# Patient Record
Sex: FEMALE | Race: WHITE | NOT HISPANIC OR LATINO
[De-identification: names, ages, dates, MRNs, and addresses within clinical notes are randomized per-mention and may not be internally consistent; named-entity substitution may affect disease eponyms.]

---

## 2017-04-26 ENCOUNTER — APPOINTMENT (OUTPATIENT)
Dept: OTOLARYNGOLOGY | Facility: CLINIC | Age: 67
End: 2017-04-26

## 2017-05-10 ENCOUNTER — APPOINTMENT (OUTPATIENT)
Dept: OTOLARYNGOLOGY | Facility: CLINIC | Age: 67
End: 2017-05-10

## 2017-05-10 VITALS
SYSTOLIC BLOOD PRESSURE: 132 MMHG | BODY MASS INDEX: 21.34 KG/M2 | HEART RATE: 78 BPM | WEIGHT: 125 LBS | HEIGHT: 64 IN | DIASTOLIC BLOOD PRESSURE: 84 MMHG

## 2018-05-21 ENCOUNTER — APPOINTMENT (OUTPATIENT)
Dept: OTOLARYNGOLOGY | Facility: CLINIC | Age: 68
End: 2018-05-21

## 2018-05-30 ENCOUNTER — APPOINTMENT (OUTPATIENT)
Dept: OTOLARYNGOLOGY | Facility: CLINIC | Age: 68
End: 2018-05-30
Payer: COMMERCIAL

## 2018-05-30 VITALS
SYSTOLIC BLOOD PRESSURE: 135 MMHG | HEIGHT: 64 IN | WEIGHT: 125 LBS | TEMPERATURE: 98.9 F | HEART RATE: 73 BPM | BODY MASS INDEX: 21.34 KG/M2 | DIASTOLIC BLOOD PRESSURE: 86 MMHG | OXYGEN SATURATION: 100 %

## 2018-05-30 DIAGNOSIS — Z82.49 FAMILY HISTORY OF ISCHEMIC HEART DISEASE AND OTHER DISEASES OF THE CIRCULATORY SYSTEM: ICD-10-CM

## 2018-05-30 DIAGNOSIS — Z83.3 FAMILY HISTORY OF DIABETES MELLITUS: ICD-10-CM

## 2018-05-30 PROCEDURE — 99213 OFFICE O/P EST LOW 20 MIN: CPT

## 2019-07-10 ENCOUNTER — APPOINTMENT (OUTPATIENT)
Dept: OTOLARYNGOLOGY | Facility: CLINIC | Age: 69
End: 2019-07-10

## 2019-07-24 ENCOUNTER — APPOINTMENT (OUTPATIENT)
Dept: OTOLARYNGOLOGY | Facility: CLINIC | Age: 69
End: 2019-07-24
Payer: COMMERCIAL

## 2019-07-24 VITALS
SYSTOLIC BLOOD PRESSURE: 144 MMHG | BODY MASS INDEX: 21.34 KG/M2 | OXYGEN SATURATION: 98 % | WEIGHT: 125 LBS | HEIGHT: 64 IN | HEART RATE: 77 BPM | DIASTOLIC BLOOD PRESSURE: 73 MMHG

## 2019-07-24 PROCEDURE — 99211 OFF/OP EST MAY X REQ PHY/QHP: CPT

## 2019-07-24 NOTE — REASON FOR VISIT
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

## 2019-07-24 NOTE — HISTORY OF PRESENT ILLNESS
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 No adjuvant therapy. \par presents today for surveillance visit, no pain, no skin changes, no dysphagia, no cranial neuropathy, no other complaints. She is followed by dermatology and the last visit was in May 2019.\par Symptoms: no neck mass.

## 2019-07-24 NOTE — ASSESSMENT
[FreeTextEntry1] : 68 y/o female with left neck cutaneous adenocarcinoma s/p surgical management in 2010, YANA\par follow up in 1 year. \par

## 2020-08-05 ENCOUNTER — APPOINTMENT (OUTPATIENT)
Dept: OTOLARYNGOLOGY | Facility: CLINIC | Age: 70
End: 2020-08-05

## 2020-08-12 ENCOUNTER — APPOINTMENT (OUTPATIENT)
Dept: OTOLARYNGOLOGY | Facility: CLINIC | Age: 70
End: 2020-08-12
Payer: COMMERCIAL

## 2020-08-12 VITALS
HEIGHT: 64 IN | OXYGEN SATURATION: 97 % | BODY MASS INDEX: 21.34 KG/M2 | HEART RATE: 69 BPM | DIASTOLIC BLOOD PRESSURE: 86 MMHG | TEMPERATURE: 98.3 F | WEIGHT: 125 LBS | SYSTOLIC BLOOD PRESSURE: 126 MMHG

## 2020-08-12 PROCEDURE — 99213 OFFICE O/P EST LOW 20 MIN: CPT

## 2020-08-13 NOTE — HISTORY OF PRESENT ILLNESS
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 No adjuvant therapy. \par \par She presents today for surveillance visit, no pain, no skin changes, no dysphagia, no cranial neuropathy, no other complaints. She is followed by dermatology. No new medical conditions or medication changes.\par \par

## 2020-08-13 NOTE — ASSESSMENT
[FreeTextEntry1] : 66 y/o female with left neck cutaneous adenocarcinoma s/p surgical management in 2010, YANA on exam.\par \par - Continue f/u with derm.\par - follow up in 6 months.\par - RTC sooner should any worrisome symptoms develop. \par

## 2020-08-13 NOTE — REASON FOR VISIT
[Subsequent Evaluation] : a subsequent evaluation for [Spouse] : spouse [FreeTextEntry2] : surveillance for left neck cutaneous neuroendocrine tumor

## 2021-09-24 ENCOUNTER — APPOINTMENT (OUTPATIENT)
Dept: OTOLARYNGOLOGY | Facility: CLINIC | Age: 71
End: 2021-09-24
Payer: MEDICARE

## 2021-09-24 VITALS
WEIGHT: 125 LBS | BODY MASS INDEX: 21.34 KG/M2 | DIASTOLIC BLOOD PRESSURE: 86 MMHG | HEIGHT: 64 IN | SYSTOLIC BLOOD PRESSURE: 144 MMHG | HEART RATE: 85 BPM | TEMPERATURE: 97.2 F

## 2021-09-24 DIAGNOSIS — C44.90 UNSPECIFIED MALIGNANT NEOPLASM OF SKIN, UNSPECIFIED: ICD-10-CM

## 2021-09-24 PROCEDURE — 99213 OFFICE O/P EST LOW 20 MIN: CPT | Mod: 25

## 2021-09-24 PROCEDURE — 69210 REMOVE IMPACTED EAR WAX UNI: CPT | Mod: RT

## 2021-09-24 NOTE — ASSESSMENT
[FreeTextEntry1] : 66 y/o female with left neck cutaneous adenocarcinoma s/p surgical management in 2010, YANA on exam.  R EAC cerumen was removed without complications today.\par \par - Continue f/u with derm.\par - follow up in 1 year.\par - Debrox as needed.\par - RTC sooner should any worrisome symptoms develop. \par

## 2021-09-24 NOTE — HISTORY OF PRESENT ILLNESS
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 No adjuvant therapy. \par \par She presents today for surveillance visit. She is followed by dermatology, last seen yesterday.   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  Otherwise, no new medical conditions, medication changes, or new neck masses or lesions.\par \par No other ENT complaints.\par \par \par

## 2021-09-24 NOTE — PROCEDURE
[FreeTextEntry3] : Verbal consent obtained.  Cerumen noted in R EAC was removed using curette without complications.

## 2021-09-24 NOTE — PHYSICAL EXAM
[Midline] : trachea located in midline position [Normal] : orientation to person, place, and time: normal [de-identified] : well-healed left neck scar [de-identified] : right EAC impacted cerumen [de-identified] : bandaid on nasal dorsum

## 2022-05-27 ENCOUNTER — APPOINTMENT (OUTPATIENT)
Dept: OTOLARYNGOLOGY | Facility: CLINIC | Age: 72
End: 2022-05-27

## 2022-06-03 ENCOUNTER — APPOINTMENT (OUTPATIENT)
Dept: OTOLARYNGOLOGY | Facility: CLINIC | Age: 72
End: 2022-06-03
Payer: MEDICARE

## 2022-06-03 DIAGNOSIS — H61.21 IMPACTED CERUMEN, RIGHT EAR: ICD-10-CM

## 2022-06-03 PROCEDURE — 69210 REMOVE IMPACTED EAR WAX UNI: CPT

## 2022-06-03 PROCEDURE — 99213 OFFICE O/P EST LOW 20 MIN: CPT | Mod: 25

## 2022-06-03 NOTE — HISTORY OF PRESENT ILLNESS
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 No adjuvant therapy. \par \par She presents today for surveillance visit. She is followed by dermatology every 8 months.  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 Otherwise, no new medical conditions, medication changes, or new neck masses or lesions.\par \par No other ENT complaints.\par  [FreeTextEntry1] : No fevers, night sweats, weight loss, or ear pain. No ENT issues.

## 2022-06-03 NOTE — PHYSICAL EXAM
[Normal] : no abnormal secretions [de-identified] : right ear impacted with cerumen, removed without issue [de-identified] : s

## 2025-06-01 ENCOUNTER — EMERGENCY (EMERGENCY)
Facility: HOSPITAL | Age: 75
LOS: 1 days | End: 2025-06-01
Attending: STUDENT IN AN ORGANIZED HEALTH CARE EDUCATION/TRAINING PROGRAM | Admitting: STUDENT IN AN ORGANIZED HEALTH CARE EDUCATION/TRAINING PROGRAM
Payer: MEDICARE

## 2025-06-01 VITALS — TEMPERATURE: 98 F

## 2025-06-01 VITALS
RESPIRATION RATE: 17 BRPM | HEART RATE: 65 BPM | DIASTOLIC BLOOD PRESSURE: 73 MMHG | WEIGHT: 115.08 LBS | OXYGEN SATURATION: 98 % | SYSTOLIC BLOOD PRESSURE: 129 MMHG

## 2025-06-01 PROCEDURE — 73030 X-RAY EXAM OF SHOULDER: CPT | Mod: 26,76

## 2025-06-01 PROCEDURE — 99284 EMERGENCY DEPT VISIT MOD MDM: CPT | Mod: 25

## 2025-06-01 PROCEDURE — 71046 X-RAY EXAM CHEST 2 VIEWS: CPT

## 2025-06-01 PROCEDURE — 23650 CLTX SHO DSLC W/MNPJ WO ANES: CPT | Mod: LT

## 2025-06-01 PROCEDURE — 73020 X-RAY EXAM OF SHOULDER: CPT | Mod: 26,XE,LT

## 2025-06-01 PROCEDURE — 23650 CLTX SHO DSLC W/MNPJ WO ANES: CPT | Mod: 54,LT

## 2025-06-01 PROCEDURE — 73060 X-RAY EXAM OF HUMERUS: CPT | Mod: 26,LT

## 2025-06-01 PROCEDURE — 96374 THER/PROPH/DIAG INJ IV PUSH: CPT | Mod: XU

## 2025-06-01 PROCEDURE — 99285 EMERGENCY DEPT VISIT HI MDM: CPT | Mod: 57

## 2025-06-01 PROCEDURE — 73030 X-RAY EXAM OF SHOULDER: CPT

## 2025-06-01 PROCEDURE — 71046 X-RAY EXAM CHEST 2 VIEWS: CPT | Mod: 26

## 2025-06-01 PROCEDURE — 73060 X-RAY EXAM OF HUMERUS: CPT

## 2025-06-01 PROCEDURE — 73020 X-RAY EXAM OF SHOULDER: CPT

## 2025-06-01 RX ORDER — HYDROMORPHONE/SOD CHLOR,ISO/PF 2 MG/10 ML
0.5 SYRINGE (ML) INJECTION ONCE
Refills: 0 | Status: DISCONTINUED | OUTPATIENT
Start: 2025-06-01 | End: 2025-06-01

## 2025-06-01 RX ORDER — LIDOCAINE HCL/PF 10 MG/ML
10 VIAL (ML) INJECTION ONCE
Refills: 0 | Status: COMPLETED | OUTPATIENT
Start: 2025-06-01 | End: 2025-06-01

## 2025-06-01 RX ADMIN — Medication 0.5 MILLIGRAM(S): at 14:31

## 2025-06-01 RX ADMIN — Medication 10 MILLILITER(S): at 14:28

## 2025-06-01 NOTE — ED ADULT TRIAGE NOTE - CHIEF COMPLAINT QUOTE
pt. a&ox4 ambulatory, s/p mechanical trip and fall, landed on LUE and c/o pain to that arm. denies headstrike, LOC, or AC use.

## 2025-06-01 NOTE — ED ADULT NURSE NOTE - OBJECTIVE STATEMENT
Pt is a 75 y/o female A&Ox4 in NAD ambulatory with steady gait c/o pain to LUE s/p trip and fall. Denies head injury, LOC, blood thinners. RR even and unlabored.

## 2025-06-01 NOTE — ED PROVIDER NOTE - OBJECTIVE STATEMENT
Patient is a 74-year-old female no segment past medical history present emergency room for mechanical fall.  Patient states she was in her usual state health tripped on the sidewalk fell directly onto her left arm.  Reports immediate pain to the left arm.  Head trauma LOC.  Denies any prior injuries to that shoulder.  Denies any prior surgeries.  Denies any weakness numbness or tingling.

## 2025-06-01 NOTE — ED PROVIDER NOTE - PROVIDER TOKENS
PROVIDER:[TOKEN:[[id number]],FOLLOWUP:[4-6 Days]],PROVIDER:[TOKEN:[[id number]],FOLLOWUP:[4-6 Days]]

## 2025-06-01 NOTE — ED PROVIDER NOTE - NSFOLLOWUPINSTRUCTIONS_ED_ALL_ED_FT
Please make sure to keep your shoulder in the  sling at all times for the next 48 hours.  Please take Tylenol and Motrin as needed for discomfort.  Please make sure to follow-up with the orthopedic surgeon in 1 to 2 weeks.  Please come back to the emergency department if you have severe pain numbness, tingling of your hands or fingers fevers severe swelling or any other emergent concerns.

## 2025-06-01 NOTE — ED PROVIDER NOTE - PATIENT PORTAL LINK FT
You can access the FollowMyHealth Patient Portal offered by [facility] by registering at the following website: [email or web address]. By joining [facility]’s FollowMyHealth portal, you will also be able to view your health information using other applications (apps) compatible with our system. You can access the FollowMyHealth Patient Portal offered by [facility] by registering at the following website: [email or web address]. By joining [facility]’s FollowMyHealth portal, you will also be able to view your health information using other applications (apps) compatible with our system.

## 2025-06-01 NOTE — ED PROVIDER NOTE - CARE PROVIDER_API CALL
Jayce Almeida Elías  Orthopaedic Surgery  [address] [place] [address], [address]  [place] [address]  Phone: [phone]  Fax: [phone]  Follow Up Time: 4-6 Days    Gianni Mcelroy  Joint Reconstruction  [address] [facility], [address]  [place] [address]  Phone: [phone]  Fax: [phone]  Follow Up Time: 4-6 Days

## 2025-06-01 NOTE — ED PROVIDER NOTE - CARE PROVIDERS DIRECT ADDRESSES
[email or web address]

## 2025-06-01 NOTE — ED PROVIDER NOTE - CLINICAL SUMMARY MEDICAL DECISION MAKING FREE TEXT BOX
Patient present emergency room for mechanical fall obvious deformity of the left shoulder will evaluate for dislocation versus fracture.  Likely for reduction pain control.  Anticipate Ortho follow-up.

## 2025-06-01 NOTE — ED PROVIDER NOTE - MUSCULOSKELETAL MINIMAL EXAM
Left shoulder with obvious deformity no palpable tenderness along humerus, forearm, hand intact distal pulses strength normal sensation intact in all dermatomes

## 2025-06-04 DIAGNOSIS — M79.602 PAIN IN LEFT ARM: ICD-10-CM
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
## 2025-06-04 DIAGNOSIS — S43.025A POSTERIOR DISLOCATION OF LEFT HUMERUS, INITIAL ENCOUNTER: ICD-10-CM
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
## 2025-06-12 ENCOUNTER — APPOINTMENT (OUTPATIENT)
Dept: ORTHOPEDIC SURGERY | Facility: CLINIC | Age: 75
End: 2025-06-12
Payer: MEDICARE

## 2025-06-12 VITALS — HEIGHT: 63 IN | WEIGHT: 115 LBS | BODY MASS INDEX: 20.38 KG/M2

## 2025-06-12 PROCEDURE — 99203 OFFICE O/P NEW LOW 30 MIN: CPT

## 2025-07-15 ENCOUNTER — APPOINTMENT (OUTPATIENT)
Dept: ORTHOPEDIC SURGERY | Facility: CLINIC | Age: 75
End: 2025-07-15
Payer: MEDICARE

## 2025-07-15 VITALS — WEIGHT: 115 LBS | HEIGHT: 63 IN | BODY MASS INDEX: 20.38 KG/M2

## 2025-07-15 PROCEDURE — 99212 OFFICE O/P EST SF 10 MIN: CPT

## 2025-08-26 ENCOUNTER — APPOINTMENT (OUTPATIENT)
Dept: ORTHOPEDIC SURGERY | Facility: CLINIC | Age: 75
End: 2025-08-26
Payer: MEDICARE

## 2025-08-26 VITALS — WEIGHT: 115 LBS | HEIGHT: 63 IN | BODY MASS INDEX: 20.38 KG/M2 | RESPIRATION RATE: 18 BRPM

## 2025-08-26 DIAGNOSIS — S43.022A POSTERIOR SUBLUXATION OF LEFT HUMERUS, INITIAL ENCOUNTER: ICD-10-CM

## 2025-08-26 PROCEDURE — 73030 X-RAY EXAM OF SHOULDER: CPT | Mod: LT

## 2025-08-26 PROCEDURE — 99212 OFFICE O/P EST SF 10 MIN: CPT
